# Patient Record
Sex: MALE | Race: ASIAN | NOT HISPANIC OR LATINO | URBAN - METROPOLITAN AREA
[De-identification: names, ages, dates, MRNs, and addresses within clinical notes are randomized per-mention and may not be internally consistent; named-entity substitution may affect disease eponyms.]

---

## 2017-10-18 ENCOUNTER — EMERGENCY (EMERGENCY)
Facility: HOSPITAL | Age: 27
LOS: 0 days | Discharge: HOME | End: 2017-10-18
Attending: EMERGENCY MEDICINE
Payer: MEDICAID

## 2017-10-18 VITALS
SYSTOLIC BLOOD PRESSURE: 151 MMHG | DIASTOLIC BLOOD PRESSURE: 65 MMHG | RESPIRATION RATE: 17 BRPM | WEIGHT: 190.04 LBS | HEART RATE: 119 BPM | TEMPERATURE: 98 F | HEIGHT: 68 IN | OXYGEN SATURATION: 99 %

## 2017-10-18 NOTE — ED ADULT NURSE REASSESSMENT NOTE - NS ED NURSE REASSESS COMMENT FT1
Patient up ambulating steady gait noted, family at bedside.  Patient alert and oriented, in NAD, cleared for discharge.

## 2017-10-18 NOTE — ED PROVIDER NOTE - PHYSICAL EXAMINATION
Gen: Alert, NAD, GCS 15  Head: NC, AT, PERRL, EOMI, normal lids/conjunctiva  ENT: Bilateral TM WNL, normal hearing, patent oropharynx without erythema/exudate, uvula midline, no epistaxis  Neck: supple, no tenderness/meningismus/JVD, Trachea midline, no C-spine TTP  Pulm: Bilateral clear BS, normal resp effort, no wheeze/stridor/retractions  CV: RRR, no M/R/G, +dist pulses  Abd: soft, NT/ND, +BS, no guarding/rebound tenderness  Mskel: no edema/erythema/cyanosis  Skin: no rash  Neuro: AAOx3 (with translation), no sensory/motor deficits, CN 2-12 intact, steady gait

## 2017-10-18 NOTE — ED PROVIDER NOTE - MEDICAL DECISION MAKING DETAILS
Pt w EtOH intox, VSS in NAD.  Dc home w cousin.  Patient advised to please follow up with their primary care doctor within the next 24 hours and return to the Emergency Department for worsening symptoms or any other concerns.  Patient advised that their doctor may call  to follow up on the specific results of the tests performed today in the emergency department.

## 2017-10-18 NOTE — ED PROVIDER NOTE - OBJECTIVE STATEMENT
Pertinent PMH/PSH/FHx/SHx and Review of Systems contained within:    26yo M w no significant PMH/PSH presents to ED for eval of EtOH intox.  Pt was drinking, went to go eat gyros and tripped in restaurant.  Employee called 911.  Cousin with patient states pt tried to apologize and explain but is english was poor and police arrived and brought him to ED.  Denies head trauma, LOC, body injuries.    No fever/chills, No photophobia/eye pain/changes in vision, No ear pain/sore throat/dysphagia, No chest pain/palpitations, no SOB/cough/wheeze/stridor, No abdominal pain, no dysuria/frequency/discharge, No neck/back pain, no rash

## 2017-10-18 NOTE — ED ADULT TRIAGE NOTE - CHIEF COMPLAINT QUOTE
as per pt's  cousin " we were both drinking whisky together then we went to a gyro place and he tripped and fell." pt noted with slurred speech, confusion and smell of alcohol on his breath. cousin denies  head injury or loss of consciousness.

## 2017-10-18 NOTE — ED ADULT NURSE NOTE - OBJECTIVE STATEMENT
as per pt's  cousin " we were both drinking whisky together then we went to a gyro place and he tripped and fell." pt noted with slurred speech, confusion and smell of alcohol on his breath. cousin denies  head injury or loss of consciousness. Pt is lethargic and is easily agitated upon arousal, emotional support provided.

## 2017-10-20 DIAGNOSIS — F10.129 ALCOHOL ABUSE WITH INTOXICATION, UNSPECIFIED: ICD-10-CM

## 2021-12-21 NOTE — ED ADULT NURSE NOTE - CAS TRG GEN SKIN CONDITION
NP without Attending Psychiatrist Warm NP without Attending Psychiatrist attending Psychiatrist without NP/Trainee